# Patient Record
Sex: FEMALE | Race: WHITE | NOT HISPANIC OR LATINO | Employment: PART TIME | URBAN - METROPOLITAN AREA
[De-identification: names, ages, dates, MRNs, and addresses within clinical notes are randomized per-mention and may not be internally consistent; named-entity substitution may affect disease eponyms.]

---

## 2017-05-25 ENCOUNTER — ALLSCRIPTS OFFICE VISIT (OUTPATIENT)
Dept: OTHER | Facility: OTHER | Age: 22
End: 2017-05-25

## 2017-05-31 ENCOUNTER — LAB CONVERSION - ENCOUNTER (OUTPATIENT)
Dept: OTHER | Facility: OTHER | Age: 22
End: 2017-05-31

## 2017-05-31 LAB
ALLERGEN CAT EPITHELIUM-DANDER (HISTORICAL): 13.7 KU/L
ALLERGEN CAT EPITHELIUM-DANDER (HISTORICAL): 2.4 MCG/ML
ALLERGEN LAMB'S QUARTER IGE (HISTORICAL): <0.1 KU/L
ALLERGEN, OAK (HISTORICAL): <0.1 KU/L
ALTERNARIA ALTERNATA (HISTORICAL): <0.1 KU/L
CLADOSPORIUM HERBARUM (HISTORICAL): <0.1 KU/L
CLASS (HISTORICAL): 0
CLASS (HISTORICAL): 2
CLASS (HISTORICAL): 3
CLASS (HISTORICAL): ABNORMAL
COMMON RAGWEED (HISTORICAL): 0.16 KU/L
D. FARINAE (HISTORICAL): <0.1 KU/L
DOG DANDER (HISTORICAL): 0.9 KU/L
KENTUCKY BLUE GRASS (HISTORICAL): <0.1 KU/L
TIMOTHY GRASS (HISTORICAL): <0.1 KU/L

## 2017-06-02 ENCOUNTER — GENERIC CONVERSION - ENCOUNTER (OUTPATIENT)
Dept: OTHER | Facility: OTHER | Age: 22
End: 2017-06-02

## 2017-07-21 ENCOUNTER — ALLSCRIPTS OFFICE VISIT (OUTPATIENT)
Dept: OTHER | Facility: OTHER | Age: 22
End: 2017-07-21

## 2017-08-31 ENCOUNTER — ALLSCRIPTS OFFICE VISIT (OUTPATIENT)
Dept: OTHER | Facility: OTHER | Age: 22
End: 2017-08-31

## 2017-11-02 ENCOUNTER — GENERIC CONVERSION - ENCOUNTER (OUTPATIENT)
Dept: OTHER | Facility: OTHER | Age: 22
End: 2017-11-02

## 2017-11-16 ENCOUNTER — GENERIC CONVERSION - ENCOUNTER (OUTPATIENT)
Dept: OTHER | Facility: OTHER | Age: 22
End: 2017-11-16

## 2018-01-11 NOTE — MISCELLANEOUS
Message   Date: 21 Nov 2017 1:58 PM EST, Recorded By: Torrey Unger   Calling For: Torrey Unger   Caller: João Mckeon   Phone: [phone] (Home)   Reason: Renew Medication   Patient called to have her BCP Rx sent to [facility] pharmacy instead of [facility]  Escript sent  Active Problems    1  Acne vulgaris (706 1) (L70 0)   2  Allergic rhinitis (477 9) (J30 9)   3  Dysmenorrhea (625 3) (N94 6)   4  Encounter for contraceptive management (V25 9) (Z30 9)   5  Nausea (787 02) (R11 0)   6  Denied: History of Patient Education - Self-Examination Of Breasts    Current Meds   1  Microgestin FE 1/20 1-20 MG-MCG Oral Tablet; TAKE 1 TABLET DAILY AS DIRECTED; Therapy: [id number] to (Last Rx:02Nov2017)  Requested for: [id number] Ordered    Allergies    1  No Known Drug Allergies    2  No Known Environmental Allergies   3   No Known Food Allergies    Plan  Encounter for contraceptive management    · Microgestin FE 1/20 1-20 MG-MCG Oral Tablet; TAKE 1 TABLET DAILY AS  DIRECTED    Signatures   Electronically signed by : Silva Berry, ; Nov 21 2017  1:59PM EST                       (Author)

## 2018-01-13 VITALS
DIASTOLIC BLOOD PRESSURE: 60 MMHG | HEART RATE: 60 BPM | TEMPERATURE: 98 F | RESPIRATION RATE: 14 BRPM | WEIGHT: 129.3 LBS | SYSTOLIC BLOOD PRESSURE: 102 MMHG | HEIGHT: 65 IN | BODY MASS INDEX: 21.54 KG/M2

## 2018-01-14 VITALS
SYSTOLIC BLOOD PRESSURE: 110 MMHG | HEIGHT: 65 IN | WEIGHT: 128.13 LBS | DIASTOLIC BLOOD PRESSURE: 64 MMHG | BODY MASS INDEX: 21.35 KG/M2

## 2018-01-14 VITALS
HEART RATE: 80 BPM | HEIGHT: 65 IN | RESPIRATION RATE: 12 BRPM | DIASTOLIC BLOOD PRESSURE: 72 MMHG | WEIGHT: 122.13 LBS | BODY MASS INDEX: 20.35 KG/M2 | SYSTOLIC BLOOD PRESSURE: 122 MMHG

## 2018-01-16 NOTE — MISCELLANEOUS
Message   Recorded as Task   Date: 06/01/2017 04:59 PM, Created By: Jose Garnica   Task Name: Call Back   Assigned To: Jose Garnica   Regarding Patient: Raysa Tarango, Status: Active   CommentOrangel Chong - 01 Jun 2017 4:59 PM     TASK CREATED  Please call patient and tell her we received her blood work  It looks as though she may have allergies to cats, dogs, and ragweed  Her blood work is positive for these but it is not a guarantee that they're necessarily problems  Just for her to remember, if these tests were negative then we know she would have no problem  Just because they are positive doesn't mean she necessarily has a problem  It's just a small clue  Jaye Riley - 02 Jun 2017 9:57 AM     TASK REPLIED TO: Previously Assigned To Jaye Riley regarding blood work results   MRP        Signatures   Electronically signed by : Karina Lujan DO; Jun 2 2017 10:03AM EST                       (Author)

## 2018-01-22 VITALS
SYSTOLIC BLOOD PRESSURE: 108 MMHG | DIASTOLIC BLOOD PRESSURE: 62 MMHG | WEIGHT: 126.38 LBS | BODY MASS INDEX: 21.05 KG/M2 | HEIGHT: 65 IN

## 2018-05-01 ENCOUNTER — OFFICE VISIT (OUTPATIENT)
Dept: FAMILY MEDICINE CLINIC | Facility: CLINIC | Age: 23
End: 2018-05-01
Payer: COMMERCIAL

## 2018-05-01 VITALS
RESPIRATION RATE: 14 BRPM | TEMPERATURE: 98 F | SYSTOLIC BLOOD PRESSURE: 116 MMHG | HEART RATE: 68 BPM | WEIGHT: 132.3 LBS | BODY MASS INDEX: 22.59 KG/M2 | HEIGHT: 64 IN | DIASTOLIC BLOOD PRESSURE: 68 MMHG

## 2018-05-01 DIAGNOSIS — R05.9 COUGH: Primary | ICD-10-CM

## 2018-05-01 PROBLEM — J30.9 ALLERGIC RHINITIS: Status: ACTIVE | Noted: 2017-05-25

## 2018-05-01 PROBLEM — L70.0 ACNE VULGARIS: Status: ACTIVE | Noted: 2017-07-21

## 2018-05-01 PROCEDURE — 99213 OFFICE O/P EST LOW 20 MIN: CPT | Performed by: NURSE PRACTITIONER

## 2018-05-01 RX ORDER — NORETHINDRONE ACETATE AND ETHINYL ESTRADIOL 1MG-20(21)
1 KIT ORAL DAILY
COMMUNITY
Start: 2017-11-02 | End: 2018-08-06

## 2018-05-01 RX ORDER — PREDNISONE 10 MG/1
20 TABLET ORAL 2 TIMES DAILY WITH MEALS
Qty: 12 TABLET | Refills: 0 | Status: SHIPPED | OUTPATIENT
Start: 2018-05-01 | End: 2018-08-06

## 2018-05-01 RX ORDER — ALBUTEROL SULFATE 90 UG/1
2 AEROSOL, METERED RESPIRATORY (INHALATION) EVERY 4 HOURS PRN
Qty: 1 INHALER | Refills: 1 | Status: SHIPPED | OUTPATIENT
Start: 2018-05-01 | End: 2020-01-11 | Stop reason: ALTCHOICE

## 2018-05-01 NOTE — PROGRESS NOTES
Assessment/Plan:        1  Cough  This is likely related to your dog allergy  Please try to get the dog to sleep somewhere else  Wash all your bedding  Start zyrtec once a day  Take the prednisone as prescribed and use the MDI  ( albuterol) as needed  Call us If this is not getting better  - Peak flow  - predniSONE 10 mg tablet; Take 2 tablets (20 mg total) by mouth 2 (two) times a day with meals  Dispense: 12 tablet; Refill: 0  - albuterol (PROVENTIL HFA,VENTOLIN HFA) 90 mcg/act inhaler; Inhale 2 puffs every 4 (four) hours as needed for wheezing (cough or wheeze)  Dispense: 1 Inhaler; Refill: 1   rto rpn     Subjective:      Patient ID: Leo Ogden is a 24 y o  female  here TODAY WITH COMPLAINT OF A COUGH THAT STARTED YESTERDAY   Cough is keeping her up at night and is tight in her chest and is wheezing  Does not have any sinus congestion  No fevers  States that she has a dog that sleeps with her at night  Had allergy testing done last year that showed that she was allergic to dogs  Does sports and can run and work out without any chest tightness or cough        OBJECTIVE  No past medical history on file  [facility]    Wt Readings from Last 3 Encounters:   05/01/18 60 kg (132 lb 4 8 oz)   11/02/17 57 3 kg (126 lb 6 1 oz)   08/31/17 55 4 kg (122 lb 2 1 oz)     BP Readings from Last 3 Encounters:   05/01/18 116/68   11/02/17 108/62   08/31/17 122/72     Pulse Readings from Last 3 Encounters:   05/01/18 68   08/31/17 80   05/25/17 60     BMI Readings from Last 3 Encounters:   05/01/18 22 71 kg/m²   11/02/17 21 16 kg/m²   08/31/17 20 45 kg/m²        Physical Exam   Constitutional: She appears well-developed and well-nourished  HENT:   Right Ear: External ear normal    Left Ear: External ear normal    Nose: Nose normal    Mouth/Throat: Oropharynx is clear and moist    Neck: Normal range of motion  Neck supple  Cardiovascular: Normal rate, regular rhythm and normal heart sounds      Pulmonary/Chest: Effort normal and breath sounds normal  No respiratory distress  She has no wheezes  Skin: Skin is warm and dry  Psychiatric: She has a normal mood and affect   Her behavior is normal  Judgment and thought content normal

## 2018-07-27 ENCOUNTER — TELEPHONE (OUTPATIENT)
Dept: OBGYN CLINIC | Facility: CLINIC | Age: 23
End: 2018-07-27

## 2018-08-06 ENCOUNTER — HOSPITAL ENCOUNTER (OUTPATIENT)
Dept: ULTRASOUND IMAGING | Facility: HOSPITAL | Age: 23
Discharge: HOME | End: 2018-08-06
Attending: OBSTETRICS & GYNECOLOGY
Payer: COMMERCIAL

## 2018-08-06 ENCOUNTER — OFFICE VISIT (OUTPATIENT)
Dept: OBGYN CLINIC | Facility: CLINIC | Age: 23
End: 2018-08-06
Payer: COMMERCIAL

## 2018-08-06 VITALS
SYSTOLIC BLOOD PRESSURE: 110 MMHG | DIASTOLIC BLOOD PRESSURE: 62 MMHG | HEIGHT: 64 IN | BODY MASS INDEX: 22.71 KG/M2 | WEIGHT: 133 LBS

## 2018-08-06 DIAGNOSIS — R10.2 PELVIC PAIN: ICD-10-CM

## 2018-08-06 DIAGNOSIS — N89.8 VAGINAL DISCHARGE: ICD-10-CM

## 2018-08-06 DIAGNOSIS — N93.0 PCB (POST COITAL BLEEDING): Primary | ICD-10-CM

## 2018-08-06 PROCEDURE — 99213 OFFICE O/P EST LOW 20 MIN: CPT | Performed by: OBSTETRICS & GYNECOLOGY

## 2018-08-06 PROCEDURE — 87210 SMEAR WET MOUNT SALINE/INK: CPT | Performed by: OBSTETRICS & GYNECOLOGY

## 2018-08-06 PROCEDURE — 76830 TRANSVAGINAL US NON-OB: CPT

## 2018-08-06 PROCEDURE — 76856 US EXAM PELVIC COMPLETE: CPT

## 2018-08-06 NOTE — PROGRESS NOTES
Assessment/Plan:     Pelvic pain - US ordered    Vaginal discharge - wet mount is normal    Post coital bleeding - gc, chlamydia, pap done    Irregular menses - seems to be resolving, She will keep menstrual calendar  We could consider progesterone only contraception if necessary  There are no diagnoses linked to this encounter  Subjective:     Patient ID: Froylan Lobo is a 24 y o  female  Patient here for evaluation of pain and irregular bleeding  She has been having pain on her left side that occurs a few times a week  It does not last long possibly few minutes  This started in the past year  She feels like she was always sensitive in her pelvic area but over the past several months to year she feels that it is worse  Sometimes she feels bloating and she often feels bloated after intercourse  She also gets this left sided pain with intercourse  She has been having postcoital bleeding in the past few months  She does not feel the pain is related to her menstrual cycles  She stopped her birth control pills in December because she was getting headaches and does not feel well on OCs in general   She has been using condoms for contraception  She was having some midcycle bleeding since June but her last menstrual cycle was normal and she has not had any bleeding or spotting this month  She sometimes has some vaginal discharge and odor but no itching  She has a new partner since last year  Review of Systems   Gastrointestinal: Positive for abdominal distention  Genitourinary: Positive for menstrual problem, pelvic pain, vaginal bleeding and vaginal discharge  All other systems reviewed and are negative  Objective:     Physical Exam   Genitourinary: Vagina normal and uterus normal  Uterus is not deviated, not enlarged, not fixed and not tender  Cervix exhibits no motion tenderness, no discharge and no friability   Right adnexum displays no mass, no tenderness and no fullness  Left adnexum displays tenderness  Left adnexum displays no mass and no fullness     Genitourinary Comments: Mild tenderness on left, no mass

## 2018-08-09 ENCOUNTER — TELEPHONE (OUTPATIENT)
Dept: OBGYN CLINIC | Facility: CLINIC | Age: 23
End: 2018-08-09

## 2018-08-09 NOTE — TELEPHONE ENCOUNTER
----- Message from Lina Martin DO sent at 8/9/2018  6:25 AM EDT -----  Please let Martha Parent know that she has a cyst on her left ovary and should have a repeat US in 6-8 weeks, thanks

## 2018-08-10 LAB
C TRACH RRNA CVX QL NAA+PROBE: NEGATIVE
CYTOLOGIST CVX/VAG CYTO: NORMAL
DX ICD CODE: NORMAL
Lab: NORMAL
N GONORRHOEA RRNA CVX QL NAA+PROBE: NEGATIVE
OTHER STN SPEC: NORMAL
OTHER STN SPEC: NORMAL
PATH REPORT.FINAL DX SPEC: NORMAL
SL AMB NOTE:: NORMAL
SL AMB SPECIMEN ADEQUACY: NORMAL
SL AMB TEST METHODOLOGY: NORMAL

## 2018-08-20 ENCOUNTER — TELEPHONE (OUTPATIENT)
Dept: OBGYN CLINIC | Facility: CLINIC | Age: 23
End: 2018-08-20

## 2018-08-20 DIAGNOSIS — N92.6 IRREGULAR MENSES: Primary | ICD-10-CM

## 2018-08-20 RX ORDER — LEVONORGESTREL AND ETHINYL ESTRADIOL 0.1-0.02MG
1 KIT ORAL DAILY
Qty: 28 TABLET | Refills: 3 | Status: SHIPPED | OUTPATIENT
Start: 2018-08-20 | End: 2018-12-10

## 2018-08-20 NOTE — TELEPHONE ENCOUNTER
She can try Aviane with her next menses and then come in for a pill check in 3 months  Also, she has an ovarian cyst and needs a repeat US in 8-12 weeks, she may have this ordered already    thanks

## 2018-09-17 ENCOUNTER — ULTRASOUND (OUTPATIENT)
Dept: OBGYN CLINIC | Facility: CLINIC | Age: 23
End: 2018-09-17
Payer: COMMERCIAL

## 2018-09-17 DIAGNOSIS — N93.0 POSTCOITAL BLEEDING: Primary | ICD-10-CM

## 2018-09-17 PROCEDURE — 76830 TRANSVAGINAL US NON-OB: CPT

## 2018-09-17 NOTE — PROGRESS NOTES
AMB US Pelvic Non OB  Date/Time: 9/17/2018 9:41 AM  Performed by: Lisa Valdez  Authorized by: Kike Novoa     Procedure details:     Indications: non-obstetric vaginal bleeding      Technique:  Transvaginal US, Non-OB    Position: lithotomy exam    Uterine findings:     Diameter (mm):  34    Length (mm):  78    Width (mm):  48    Endometrial stripe: identified      Endometrium thickness (mm):  3 7  Left ovary findings:     Left ovary:  Visualized    Diameter (mm):  20 3    Length (mm):  40 9    Width (mm):  29 9  Right ovary findings:     Right ovary:  Visualized    Diameter (mm):  19 5    Length (mm):  36 8    Width (mm):  29 9  Other findings:     Free pelvic fluid: identified    Post-Procedure Details:     Impression:  Anteverted uterus and bilateral ovaries appear within normal limits  Small amount of free fluid is noted within the cul de sac  Tolerance: Tolerated well, no immediate complications    Complications: no complications    Additional Procedure Comments:      Siemens Acuson X150 EC9-4 transvaginal transducer Serial # [id number] was used to perform the examination today and subsequently followed with high level disinfection utilizing Trophon EPR procedure

## 2019-08-06 ENCOUNTER — TELEPHONE (OUTPATIENT)
Dept: OBGYN CLINIC | Facility: CLINIC | Age: 24
End: 2019-08-06

## 2019-08-06 NOTE — TELEPHONE ENCOUNTER
Patient called with complaints about symptoms with her birth control patient believes she might have BV  Patient has moved to [facility]

## 2019-08-07 NOTE — TELEPHONE ENCOUNTER
Reyes Peters,   can you see what symptoms she is having? See note from Kirstie Goldstein  Is it discharge and odor or other symptoms?   thanks

## 2019-08-07 NOTE — TELEPHONE ENCOUNTER
Pt is having clear discharge, but ut is foul smelling    She said that she is urinating fine and everything else is fine

## 2019-08-08 DIAGNOSIS — B96.89 BACTERIAL VAGINOSIS: Primary | ICD-10-CM

## 2019-08-08 DIAGNOSIS — N76.0 BACTERIAL VAGINOSIS: Primary | ICD-10-CM

## 2019-08-08 RX ORDER — METRONIDAZOLE 7.5 MG/G
1 GEL VAGINAL
Qty: 70 G | Refills: 0 | Status: SHIPPED | OUTPATIENT
Start: 2019-08-08 | End: 2019-08-13

## 2020-01-11 ENCOUNTER — OFFICE VISIT (OUTPATIENT)
Dept: FAMILY MEDICINE CLINIC | Facility: CLINIC | Age: 25
End: 2020-01-11
Payer: COMMERCIAL

## 2020-01-11 VITALS
HEIGHT: 64 IN | RESPIRATION RATE: 14 BRPM | WEIGHT: 151.1 LBS | SYSTOLIC BLOOD PRESSURE: 130 MMHG | DIASTOLIC BLOOD PRESSURE: 82 MMHG | HEART RATE: 80 BPM | BODY MASS INDEX: 25.8 KG/M2

## 2020-01-11 DIAGNOSIS — R06.02 WAKING AT NIGHT SHORT OF BREATH: ICD-10-CM

## 2020-01-11 DIAGNOSIS — R42 DIZZINESS: ICD-10-CM

## 2020-01-11 DIAGNOSIS — J30.9 ALLERGIC RHINITIS, UNSPECIFIED SEASONALITY, UNSPECIFIED TRIGGER: Primary | ICD-10-CM

## 2020-01-11 DIAGNOSIS — R06.02 SHORTNESS OF BREATH: ICD-10-CM

## 2020-01-11 PROCEDURE — 3008F BODY MASS INDEX DOCD: CPT | Performed by: FAMILY MEDICINE

## 2020-01-11 PROCEDURE — 1036F TOBACCO NON-USER: CPT | Performed by: FAMILY MEDICINE

## 2020-01-11 PROCEDURE — 99214 OFFICE O/P EST MOD 30 MIN: CPT | Performed by: FAMILY MEDICINE

## 2020-01-11 PROCEDURE — 93000 ELECTROCARDIOGRAM COMPLETE: CPT | Performed by: FAMILY MEDICINE

## 2020-01-11 RX ORDER — CETIRIZINE HYDROCHLORIDE 10 MG/1
10 TABLET ORAL
Qty: 30 TABLET | COMMUNITY
Start: 2020-01-11

## 2020-01-11 RX ORDER — BUDESONIDE AND FORMOTEROL FUMARATE DIHYDRATE 80; 4.5 UG/1; UG/1
2 AEROSOL RESPIRATORY (INHALATION)
Qty: 1 INHALER | Refills: 0 | Status: SHIPPED | OUTPATIENT
Start: 2020-01-11 | End: 2020-03-17

## 2020-01-11 NOTE — PROGRESS NOTES
Assessment/Plan:    1  Allergic rhinitis  - start Zyrtec 10 mg daily at bedtime  - cetirizine (ZyrTEC) 10 mg tablet; Take 1 tablet (10 mg total) by mouth daily at bedtime  Dispense: 30 tablet    2  Waking at night short of breath  - will obtain lab work and call with results  - start Symbicort daily at bedtime  - Comprehensive metabolic panel; Future  - CBC and differential; Future  - TSH, 3rd generation with Free T4 reflex; Future  - budesonide-formoterol (SYMBICORT) 80-4 5 MCG/ACT inhaler; Inhale 2 puffs daily at bedtime Rinse mouth after use  Dispense: 1 Inhaler; Refill: 0    3  Dizziness  - POCT ECG showed NSR  - will obtain lab work, discussed good hydration  - Comprehensive metabolic panel; Future  - CBC and differential; Future  - TSH, 3rd generation with Free T4 reflex; Future    Follow up in 3-4 weeks or sooner as needed  Possible side effects of new medications were reviewed with the patient today  The treatment plan was reviewed with the patient  The patient understands and agrees with the treatment plan        Subjective:   Chief Complaint   Patient presents with    Shortness of Breath     mostly at night     Wheezing    Dizziness      Patient ID: Pee Rai is a 21 y o  female here today with c/o increased nasal congestion, chest tightness and wheezing mostly at night for the past few weeks, she lives with a roommate in [address] [place] [address] and her roommate now has a cat  Patient has been trying to keep the cat out of her room and vacuum cleans her room at least once a week, but still having symptoms  Patent also reports occasional episodes of dizziness in the past few weeks, few times happened while she was taking a shower  No associated chest pain, palpitations or syncope  She had her LMP on 12/31/19, her periods have been regular on OCP's with normal flow, she denies heavy periods         The following portions of the patient's history were reviewed and updated as appropriate: allergies, current medications, past family history, past medical history, past social history, past surgical history and problem list     Past Medical History:   Diagnosis Date    Wheezing      Past Surgical History:   Procedure Laterality Date    WISDOM TOOTH EXTRACTION       Family History   Problem Relation Age of Onset    No Known Problems Mother     No Known Problems Father     Leukemia Maternal Grandfather      Social History     Socioeconomic History    Marital status: Single     Spouse name: Not on file    Number of children: Not on file    Years of education: Not on file    Highest education level: Not on file   Occupational History    Not on file   Social Needs    Financial resource strain: Not on file    Food insecurity:     Worry: Not on file     Inability: Not on file    Transportation needs:     Medical: Not on file     Non-medical: Not on file   Tobacco Use    Smoking status: Never Smoker    Smokeless tobacco: Never Used   Substance and Sexual Activity    Alcohol use: Yes     Comment: OCCASIONAL/NOT WEEKLY    Drug use: No    Sexual activity: Yes     Birth control/protection: None   Lifestyle    Physical activity:     Days per week: Not on file     Minutes per session: Not on file    Stress: Not on file   Relationships    Social connections:     Talks on phone: Not on file     Gets together: Not on file     Attends Judaism service: Not on file     Active member of club or organization: Not on file     Attends meetings of clubs or organizations: Not on file     Relationship status: Not on file    Intimate partner violence:     Fear of current or ex partner: Not on file     Emotionally abused: Not on file     Physically abused: Not on file     Forced sexual activity: Not on file   Other Topics Concern    Not on file   Social History Narrative    Not on file       Current Outpatient Medications:     levonorgestrel-ethinyl estradiol ([facility]) 0 1-20 MG-MCG per tablet, Take 1 tablet by mouth daily for 112 days, Disp: 28 tablet, Rfl: 3    Review of Systems   Constitutional: Negative for appetite change, chills, fatigue, fever and unexpected weight change  HENT: Positive for congestion  Negative for sore throat  Respiratory: Positive for shortness of breath  Negative for cough and wheezing  Cardiovascular: Negative for chest pain, palpitations and leg swelling  Gastrointestinal: Negative for abdominal pain, blood in stool, constipation, diarrhea, nausea and vomiting  Genitourinary: Negative for difficulty urinating, dysuria, flank pain, frequency, hematuria, pelvic pain and urgency  Musculoskeletal: Negative for arthralgias, joint swelling and myalgias  Neurological: Positive for dizziness  Negative for syncope, weakness, numbness and headaches  Hematological: Negative for adenopathy  Psychiatric/Behavioral: Negative for dysphoric mood and sleep disturbance  The patient is not nervous/anxious  Objective:    Vitals:    01/11/20 0955   BP: 130/82   BP Location: Left arm   Patient Position: Sitting   Cuff Size: Standard   Pulse: 80   Resp: 14   Weight: 68 5 kg (151 lb 1 6 oz)   Height: 5' 4 25" (1 632 m)        Physical Exam   Constitutional: She is oriented to person, place, and time  She appears well-developed and well-nourished  No distress  HENT:   Right Ear: Tympanic membrane and ear canal normal    Left Ear: Tympanic membrane and ear canal normal    Nose: Mucosal edema present  Mouth/Throat: Oropharynx is clear and moist    Neck: Neck supple  No thyromegaly present  Cardiovascular: Normal rate, regular rhythm and normal heart sounds  No murmur heard  Pulmonary/Chest: Effort normal  No respiratory distress  She has no wheezes  She has no rhonchi  She has no rales  Abdominal: Soft  She exhibits no distension and no mass  There is no tenderness  Musculoskeletal: She exhibits no edema     Lymphadenopathy:     She has no cervical adenopathy  Neurological: She is alert and oriented to person, place, and time  No cranial nerve deficit  Psychiatric: She has a normal mood and affect  Her behavior is normal  Thought content normal          BMI Counseling: Body mass index is 25 73 kg/m²  The BMI is above normal  Nutrition recommendations include reducing portion sizes, decreasing overall calorie intake and consuming healthier snacks  Exercise recommendations include exercising 3-5 times per week

## 2020-03-17 DIAGNOSIS — R06.02 WAKING AT NIGHT SHORT OF BREATH: ICD-10-CM

## 2020-03-17 RX ORDER — DILTIAZEM HYDROCHLORIDE 60 MG/1
TABLET, FILM COATED ORAL
Qty: 10.2 G | Refills: 0 | Status: SHIPPED | OUTPATIENT
Start: 2020-03-17

## 2021-08-24 ENCOUNTER — TELEPHONE (OUTPATIENT)
Dept: FAMILY MEDICINE CLINIC | Facility: CLINIC | Age: 26
End: 2021-08-24